# Patient Record
Sex: MALE | Race: WHITE | NOT HISPANIC OR LATINO | URBAN - METROPOLITAN AREA
[De-identification: names, ages, dates, MRNs, and addresses within clinical notes are randomized per-mention and may not be internally consistent; named-entity substitution may affect disease eponyms.]

---

## 2024-10-04 ENCOUNTER — TELEPHONE (OUTPATIENT)
Age: 44
End: 2024-10-04

## 2024-10-04 NOTE — TELEPHONE ENCOUNTER
[facility] called in to schedule the patient with a rheum . Patient chart was created she will refax the patient referral .     Please complete the missing information in the patient chart . Thank you

## 2024-11-19 ENCOUNTER — TELEPHONE (OUTPATIENT)
Age: 44
End: 2024-11-19

## 2024-11-19 NOTE — TELEPHONE ENCOUNTER
[facility] called into confirm if we received the [facility] . Patient referral is scanned in to his chart . I was able to create a shell for the patient , I was not getting the opt to creat a new referral at the bottom of the appt desk . If the patient calls to schedule an appt please schedule and create the shell . Thank you